# Patient Record
Sex: FEMALE | Race: BLACK OR AFRICAN AMERICAN | NOT HISPANIC OR LATINO | URBAN - METROPOLITAN AREA
[De-identification: names, ages, dates, MRNs, and addresses within clinical notes are randomized per-mention and may not be internally consistent; named-entity substitution may affect disease eponyms.]

---

## 2023-02-20 ENCOUNTER — EMERGENCY (EMERGENCY)
Facility: HOSPITAL | Age: 43
LOS: 1 days | Discharge: HOME | End: 2023-02-20
Attending: STUDENT IN AN ORGANIZED HEALTH CARE EDUCATION/TRAINING PROGRAM
Payer: COMMERCIAL

## 2023-02-20 VITALS — RESPIRATION RATE: 18 BRPM | OXYGEN SATURATION: 98 % | HEART RATE: 104 BPM

## 2023-02-20 VITALS
DIASTOLIC BLOOD PRESSURE: 89 MMHG | HEART RATE: 110 BPM | SYSTOLIC BLOOD PRESSURE: 129 MMHG | TEMPERATURE: 99 F | OXYGEN SATURATION: 98 % | RESPIRATION RATE: 18 BRPM | WEIGHT: 160.06 LBS

## 2023-02-20 DIAGNOSIS — Z98.89 OTHER SPECIFIED POSTPROCEDURAL STATES: Chronic | ICD-10-CM

## 2023-02-20 PROCEDURE — 99284 EMERGENCY DEPT VISIT MOD MDM: CPT

## 2023-02-20 PROCEDURE — 99283 EMERGENCY DEPT VISIT LOW MDM: CPT

## 2023-02-20 RX ORDER — FAMOTIDINE 10 MG/ML
20 INJECTION INTRAVENOUS ONCE
Refills: 0 | Status: COMPLETED | OUTPATIENT
Start: 2023-02-20 | End: 2023-02-20

## 2023-02-20 RX ORDER — FAMOTIDINE 10 MG/ML
1 INJECTION INTRAVENOUS
Qty: 14 | Refills: 0
Start: 2023-02-20 | End: 2023-02-26

## 2023-02-20 RX ORDER — LORATADINE 10 MG/1
1 TABLET ORAL
Qty: 15 | Refills: 0
Start: 2023-02-20 | End: 2023-03-06

## 2023-02-20 RX ADMIN — FAMOTIDINE 20 MILLIGRAM(S): 10 INJECTION INTRAVENOUS at 21:24

## 2023-02-20 RX ADMIN — Medication 40 MILLIGRAM(S): at 21:24

## 2023-02-20 NOTE — ED PROVIDER NOTE - CLINICAL SUMMARY MEDICAL DECISION MAKING FREE TEXT BOX
42 year old female with allergic reaction. Already taking benadryl. Will give prednisone and Pepcid. Follow up with allergist.

## 2023-02-20 NOTE — ED PROVIDER NOTE - NSFOLLOWUPINSTRUCTIONS_ED_ALL_ED_FT
Follow up with your primary care doctor within 1 week    Follow up with an allergist within 1 month.     Continue to benadryl 50 mg every 6 hours.     Prednisone and Pepcid sent to the pharmacy, first dose was given in the emergency room. Next dose will be tomorrow.     Take a daily allergy pill such as Claritin, Allegra, etc.     You can take motrin/tylenol as needed for pain.    If you experience any new or worsening symptoms or if you are concerned you can always come back to the emergency for a re-evaluation.

## 2023-02-20 NOTE — ED ADULT TRIAGE NOTE - CHIEF COMPLAINT QUOTE
redness, rash  with itchiness to neck , hands and inner thighs since yesterday , been taking benadryl over the counter but not helping

## 2023-02-20 NOTE — ED PROVIDER NOTE - OBJECTIVE STATEMENT
42-year-old female with no past medical history presents with a red, itchy rash to her neck, hands and inner thighs that began yesterday.  Patient reports she has been taking Benadryl 50 mg every 4-6 hours with minimal relief. Denies any new soaps, foods, shampoos, conditions, cleaning products. She denies tongue swelling, difficulty swallowing, shortness of breath, vomiting, nausea, diarrhea

## 2023-02-20 NOTE — ED ADULT NURSE NOTE - SUICIDE SCREENING QUESTION 1
Update History & Physical    The patient's History and Physical of July 13, colonoscopy was reviewed with the patient and I examined the patient. There was no change. The surgical site was confirmed by the patient and me. Plan: The risks, benefits, expected outcome, and alternative to the recommended procedure have been discussed with the patient. Patient understands and wants to proceed with the procedure.      Electronically signed by Dorys Ramos MD on 7/25/2022 at 8:28 AM No

## 2023-02-20 NOTE — ED PROVIDER NOTE - CHILD ABUSE FACILITY
Drug Change Request    WIXELA INHUB DISKUS 100/50MCG 60S Prescribed Qty: 180 Use 1 Inhalation by mouth twice daily    Message:  Drug not covered by patient plan. The preferred alternative is ADVAIRDISKUS, ADVAIRHFA, BREOELLIPTA migdalia. Please call/fax the pharmacy to change medication along with strength, directions, quantity, and refills. I don't see Jessie Vasquez as an active medication. Should patient be taking this ?  If so please see note below Spoke with patient and she received Advair inhaler. She is not using Wixela    No further intervention needed.     Lenny Palma ANP H no

## 2023-02-20 NOTE — ED PROVIDER NOTE - PATIENT PORTAL LINK FT
You can access the FollowMyHealth Patient Portal offered by [facility] by registering at the following website: [email or web address]. By joining [facility] FollowMyHealth portal, you will also be able to view your health information using other applications (apps) compatible with our system.